# Patient Record
Sex: MALE | Race: BLACK OR AFRICAN AMERICAN | Employment: FULL TIME | URBAN - METROPOLITAN AREA
[De-identification: names, ages, dates, MRNs, and addresses within clinical notes are randomized per-mention and may not be internally consistent; named-entity substitution may affect disease eponyms.]

---

## 2017-01-25 ENCOUNTER — HOSPITAL ENCOUNTER (EMERGENCY)
Age: 42
Discharge: HOME | End: 2017-01-25
Attending: EMERGENCY MEDICINE
Payer: SELF-PAY

## 2017-01-25 ENCOUNTER — APPOINTMENT (OUTPATIENT)
Dept: GENERAL RADIOLOGY | Age: 42
End: 2017-01-25
Attending: EMERGENCY MEDICINE
Payer: SELF-PAY

## 2017-01-25 VITALS
TEMPERATURE: 98.2 F | SYSTOLIC BLOOD PRESSURE: 116 MMHG | OXYGEN SATURATION: 98 % | DIASTOLIC BLOOD PRESSURE: 78 MMHG | BODY MASS INDEX: 28.88 KG/M2 | HEIGHT: 74 IN | HEART RATE: 70 BPM | WEIGHT: 225 LBS | RESPIRATION RATE: 16 BRPM

## 2017-01-25 DIAGNOSIS — M50.30 DDD (DEGENERATIVE DISC DISEASE), CERVICAL: ICD-10-CM

## 2017-01-25 DIAGNOSIS — R03.0 ELEVATED BLOOD PRESSURE READING: ICD-10-CM

## 2017-01-25 DIAGNOSIS — M54.12 CERVICAL RADICULAR PAIN: Primary | ICD-10-CM

## 2017-01-25 LAB
ANION GAP BLD CALC-SCNC: 6 MMOL/L (ref 3–18)
ATRIAL RATE: 71 BPM
BASOPHILS # BLD AUTO: 0.1 K/UL (ref 0–0.06)
BASOPHILS # BLD: 1 % (ref 0–2)
BUN SERPL-MCNC: 13 MG/DL (ref 7–18)
BUN/CREAT SERPL: 13 (ref 12–20)
CALCIUM SERPL-MCNC: 8.9 MG/DL (ref 8.5–10.1)
CALCULATED P AXIS, ECG09: 46 DEGREES
CALCULATED R AXIS, ECG10: 60 DEGREES
CALCULATED T AXIS, ECG11: 54 DEGREES
CHLORIDE SERPL-SCNC: 106 MMOL/L (ref 100–108)
CK MB CFR SERPL CALC: 0.7 % (ref 0–4)
CK MB SERPL-MCNC: 2.4 NG/ML (ref 0.5–3.6)
CK SERPL-CCNC: 324 U/L (ref 39–308)
CO2 SERPL-SCNC: 27 MMOL/L (ref 21–32)
CREAT SERPL-MCNC: 0.98 MG/DL (ref 0.6–1.3)
DIAGNOSIS, 93000: NORMAL
DIFFERENTIAL METHOD BLD: ABNORMAL
EOSINOPHIL # BLD: 0.4 K/UL (ref 0–0.4)
EOSINOPHIL NFR BLD: 7 % (ref 0–5)
ERYTHROCYTE [DISTWIDTH] IN BLOOD BY AUTOMATED COUNT: 14.1 % (ref 11.6–14.5)
GLUCOSE SERPL-MCNC: 108 MG/DL (ref 74–99)
HCT VFR BLD AUTO: 42.5 % (ref 36–48)
HGB BLD-MCNC: 14.4 G/DL (ref 13–16)
LYMPHOCYTES # BLD AUTO: 36 % (ref 21–52)
LYMPHOCYTES # BLD: 2 K/UL (ref 0.9–3.6)
MCH RBC QN AUTO: 31.2 PG (ref 24–34)
MCHC RBC AUTO-ENTMCNC: 33.9 G/DL (ref 31–37)
MCV RBC AUTO: 92.2 FL (ref 74–97)
MONOCYTES # BLD: 0.3 K/UL (ref 0.05–1.2)
MONOCYTES NFR BLD AUTO: 6 % (ref 3–10)
NEUTS SEG # BLD: 2.7 K/UL (ref 1.8–8)
NEUTS SEG NFR BLD AUTO: 50 % (ref 40–73)
P-R INTERVAL, ECG05: 190 MS
PLATELET # BLD AUTO: 168 K/UL (ref 135–420)
PMV BLD AUTO: 12.4 FL (ref 9.2–11.8)
POTASSIUM SERPL-SCNC: 3.9 MMOL/L (ref 3.5–5.5)
Q-T INTERVAL, ECG07: 380 MS
QRS DURATION, ECG06: 90 MS
QTC CALCULATION (BEZET), ECG08: 412 MS
RBC # BLD AUTO: 4.61 M/UL (ref 4.7–5.5)
SODIUM SERPL-SCNC: 139 MMOL/L (ref 136–145)
TROPONIN I SERPL-MCNC: <0.02 NG/ML (ref 0–0.04)
VENTRICULAR RATE, ECG03: 71 BPM
WBC # BLD AUTO: 5.4 K/UL (ref 4.6–13.2)

## 2017-01-25 PROCEDURE — 85025 COMPLETE CBC W/AUTO DIFF WBC: CPT | Performed by: EMERGENCY MEDICINE

## 2017-01-25 PROCEDURE — 80048 BASIC METABOLIC PNL TOTAL CA: CPT | Performed by: EMERGENCY MEDICINE

## 2017-01-25 PROCEDURE — 96374 THER/PROPH/DIAG INJ IV PUSH: CPT

## 2017-01-25 PROCEDURE — 72050 X-RAY EXAM NECK SPINE 4/5VWS: CPT

## 2017-01-25 PROCEDURE — 99283 EMERGENCY DEPT VISIT LOW MDM: CPT

## 2017-01-25 PROCEDURE — 74011250636 HC RX REV CODE- 250/636: Performed by: EMERGENCY MEDICINE

## 2017-01-25 PROCEDURE — 82550 ASSAY OF CK (CPK): CPT | Performed by: EMERGENCY MEDICINE

## 2017-01-25 PROCEDURE — 93005 ELECTROCARDIOGRAM TRACING: CPT

## 2017-01-25 PROCEDURE — 71020 XR CHEST PA LAT: CPT

## 2017-01-25 RX ORDER — METHYLPREDNISOLONE 4 MG/1
TABLET ORAL
Qty: 1 DOSE PACK | Refills: 0 | Status: SHIPPED | OUTPATIENT
Start: 2017-01-25 | End: 2017-10-10

## 2017-01-25 RX ORDER — GABAPENTIN 300 MG/1
300 CAPSULE ORAL 3 TIMES DAILY
Qty: 30 CAP | Refills: 0 | Status: SHIPPED | OUTPATIENT
Start: 2017-01-25 | End: 2017-02-04

## 2017-01-25 RX ORDER — IBUPROFEN 800 MG/1
800 TABLET ORAL EVERY 8 HOURS
Qty: 15 TAB | Refills: 0 | Status: SHIPPED | OUTPATIENT
Start: 2017-01-25 | End: 2017-01-30

## 2017-01-25 RX ORDER — KETOROLAC TROMETHAMINE 30 MG/ML
30 INJECTION, SOLUTION INTRAMUSCULAR; INTRAVENOUS
Status: COMPLETED | OUTPATIENT
Start: 2017-01-25 | End: 2017-01-25

## 2017-01-25 RX ADMIN — KETOROLAC TROMETHAMINE 30 MG: 30 INJECTION, SOLUTION INTRAMUSCULAR at 08:51

## 2017-01-25 NOTE — ED TRIAGE NOTES
L shoulder pain, back pain and L arm pain x one week, \"feels like a pinched nerve\", denies recent injury/trauma to L shoulder    L groin pain, onset three weeks, states L leg is numb when he wakes up

## 2017-01-25 NOTE — PROGRESS NOTES
Referral received from ED PA to assist with PCP follow up. Met with pt and agreed for me to assist. Appointment scheduled with Giana Asif NP on Monday 1/30/17 at 1000 check in at 56. Appointment information given to pt.

## 2017-01-25 NOTE — ED PROVIDER NOTES
Patient is a 46 y.o. male presenting with shoulder pain and groin pain. The history is provided by the patient. Shoulder Pain    Pertinent negatives include no numbness. Groin Pain   Associated symptoms include chest pain. Pertinent negatives include no abdominal pain and no shortness of breath. Pt presents with c/o left shoulder pain radiating from his neck and chest x 1 week. Pain improves w/left arm elevation. Denies numbness. Says pain is \"burning, like a pinched nerve. \"  Denies exertional chest pain, SOB. Denies neck trauma, rigidity, fever, rash. Denies ETOH, tobacco, illicits. Past Medical History:   Diagnosis Date    Asthma        History reviewed. No pertinent past surgical history. History reviewed. No pertinent family history. Social History     Social History    Marital status: SINGLE     Spouse name: N/A    Number of children: N/A    Years of education: N/A     Occupational History    Not on file. Social History Main Topics    Smoking status: Never Smoker    Smokeless tobacco: Not on file    Alcohol use No    Drug use: No    Sexual activity: Yes     Partners: Female      Comment:  haider ok to give info     Other Topics Concern    Not on file     Social History Narrative         ALLERGIES: Robitussin [guaifenesin] and Shellfish derived    Review of Systems   Constitutional: Negative for fever. Respiratory: Negative for shortness of breath. Cardiovascular: Positive for chest pain. Gastrointestinal: Negative for abdominal pain, diarrhea, nausea and vomiting. Genitourinary: Negative for flank pain. Musculoskeletal: Positive for arthralgias. Skin: Negative for rash. Neurological: Negative for weakness and numbness. All other systems reviewed and are negative.       Vitals:    01/25/17 0725   BP: (!) 137/99   Pulse: 66   Resp: 14   Temp: 98.2 °F (36.8 °C)   Weight: 102.1 kg (225 lb)   Height: 6' 2\" (1.88 m)            Physical Exam Constitutional: Vital signs are normal. He appears well-developed and well-nourished. He is active. Non-toxic appearance. He does not appear ill. No distress. HENT:   Head: Normocephalic and atraumatic. Neck: Normal range of motion. Neck supple. Carotid bruit is not present. No tracheal deviation present. No thyromegaly present. Pain with axial loading. Midline inferior cervical spine TTP. Cardiovascular: Normal rate, regular rhythm and normal heart sounds. Exam reveals no gallop and no friction rub. No murmur heard. Pulmonary/Chest: Effort normal and breath sounds normal. No stridor. No respiratory distress. He has no wheezes. He has no rales. He exhibits no tenderness. Abdominal: Soft. He exhibits no distension and no mass. There is no tenderness. There is no rebound, no guarding and no CVA tenderness. Musculoskeletal: Normal range of motion. Neurological: He is alert. Skin: Skin is warm, dry and intact. He is not diaphoretic. No pallor. Psychiatric: He has a normal mood and affect. His speech is normal and behavior is normal. Judgment and thought content normal.   Nursing note and vitals reviewed. MDM  Number of Diagnoses or Management Options  Cervical radicular pain:   DDD (degenerative disc disease), cervical:   Diagnosis management comments: Differential: DDD; radiculopathy; musculoskeletal strain; angina; thoracic outlet    Pt has pain radiating from neck to left shoulder to left arm. Moderate DDD on c-spine. Cardiac labs negative.   Give PCP referral.       Amount and/or Complexity of Data Reviewed  Clinical lab tests: reviewed and ordered  Tests in the radiology section of CPT®: ordered and reviewed      ED Course       EKG  Date/Time: 1/25/2017 9:29 AM  Performed by: Shahnaz Magallon  Authorized by: Shahnaz Magallon     Interpretation:     Interpretation: normal    Rate:     ECG rate:  71    ECG rate assessment: normal    Rhythm:     Rhythm: sinus rhythm    Ectopy:     Ectopy: none    QRS:     QRS axis:  Normal    QRS intervals:  Normal  Conduction:     Conduction: normal    ST segments:     ST segments:  Normal  T waves:     T waves: normal                 Recent Results (from the past 12 hour(s))   EKG, 12 LEAD, INITIAL    Collection Time: 01/25/17  7:35 AM   Result Value Ref Range    Ventricular Rate 71 BPM    Atrial Rate 71 BPM    P-R Interval 190 ms    QRS Duration 90 ms    Q-T Interval 380 ms    QTC Calculation (Bezet) 412 ms    Calculated P Axis 46 degrees    Calculated R Axis 60 degrees    Calculated T Axis 54 degrees    Diagnosis       Sinus rhythm with marked sinus arrhythmia  Otherwise normal ECG  When compared with ECG of 20-AUG-2015 09:40,  No significant change was found     CARDIAC PANEL,(CK, CKMB & TROPONIN)    Collection Time: 01/25/17  8:50 AM   Result Value Ref Range     (H) 39 - 308 U/L    CK - MB 2.4 0.5 - 3.6 ng/ml    CK-MB Index 0.7 0.0 - 4.0 %    Troponin-I, Qt. <0.02 0.0 - 4.978 NG/ML   METABOLIC PANEL, BASIC    Collection Time: 01/25/17  8:50 AM   Result Value Ref Range    Sodium 139 136 - 145 mmol/L    Potassium 3.9 3.5 - 5.5 mmol/L    Chloride 106 100 - 108 mmol/L    CO2 27 21 - 32 mmol/L    Anion gap 6 3.0 - 18 mmol/L    Glucose 108 (H) 74 - 99 mg/dL    BUN 13 7.0 - 18 MG/DL    Creatinine 0.98 0.6 - 1.3 MG/DL    BUN/Creatinine ratio 13 12 - 20      GFR est AA >60 >60 ml/min/1.73m2    GFR est non-AA >60 >60 ml/min/1.73m2    Calcium 8.9 8.5 - 10.1 MG/DL   CBC WITH AUTOMATED DIFF    Collection Time: 01/25/17  8:50 AM   Result Value Ref Range    WBC 5.4 4.6 - 13.2 K/uL    RBC 4.61 (L) 4.70 - 5.50 M/uL    HGB 14.4 13.0 - 16.0 g/dL    HCT 42.5 36.0 - 48.0 %    MCV 92.2 74.0 - 97.0 FL    MCH 31.2 24.0 - 34.0 PG    MCHC 33.9 31.0 - 37.0 g/dL    RDW 14.1 11.6 - 14.5 %    PLATELET 838 222 - 926 K/uL    MPV 12.4 (H) 9.2 - 11.8 FL    NEUTROPHILS 50 40 - 73 %    LYMPHOCYTES 36 21 - 52 %    MONOCYTES 6 3 - 10 %    EOSINOPHILS 7 (H) 0 - 5 %    BASOPHILS 1 0 - 2 % ABS. NEUTROPHILS 2.7 1.8 - 8.0 K/UL    ABS. LYMPHOCYTES 2.0 0.9 - 3.6 K/UL    ABS. MONOCYTES 0.3 0.05 - 1.2 K/UL    ABS. EOSINOPHILS 0.4 0.0 - 0.4 K/UL    ABS. BASOPHILS 0.1 (H) 0.0 - 0.06 K/UL    DF AUTOMATED       9:29 AM  Diagnosis:   1. Cervical radicular pain    2. DDD (degenerative disc disease), cervical    3. Elevated blood pressure reading          Disposition: home    Follow-up Information     Follow up With Details Comments Contact Dionisio Chavez MD Schedule an appointment as soon as possible for a visit today  [address] [facility], [place] [address] [facility] EMERGENCY DEPT  If symptoms worsen return immediately [address] [place] [address]    Jose De Jesus Elder MD Schedule an appointment as soon as possible for a visit in 1 week  [address] [phone]            Patient's Medications   Start Taking    GABAPENTIN (NEURONTIN) 300 MG CAPSULE    Take 1 Cap by mouth three (3) times daily for 10 days. IBUPROFEN (MOTRIN) 800 MG TABLET    Take 1 Tab by mouth every eight (8) hours for 5 days. METHYLPREDNISOLONE (MEDROL, BON,) 4 MG TABLET    Per dose pack instructions   Continue Taking    No medications on file   These Medications have changed    No medications on file   Stop Taking    ALBUTEROL (PROVENTIL HFA, VENTOLIN HFA, PROAIR HFA) 90 MCG/ACTUATION INHALER    Take 1-2 puffs by inhalation every four (4) hours as needed for Wheezing. HYDROCODONE-ACETAMINOPHEN (NORCO) 5-325 MG PER TABLET    Take 1-2 tablets PO every 4-6 hours as needed for pain control. If over the counter ibuprofen or acetaminophen was suggested, then only take the vicodin for pain not well controlled with the over the counter medication.

## 2017-01-30 ENCOUNTER — OFFICE VISIT (OUTPATIENT)
Dept: INTERNAL MEDICINE CLINIC | Age: 42
End: 2017-01-30

## 2017-01-30 VITALS
OXYGEN SATURATION: 100 % | HEIGHT: 74 IN | TEMPERATURE: 97.2 F | WEIGHT: 217 LBS | SYSTOLIC BLOOD PRESSURE: 129 MMHG | BODY MASS INDEX: 27.85 KG/M2 | RESPIRATION RATE: 16 BRPM | DIASTOLIC BLOOD PRESSURE: 94 MMHG | HEART RATE: 56 BPM

## 2017-01-30 DIAGNOSIS — M54.2 NECK PAIN: Primary | ICD-10-CM

## 2017-01-30 DIAGNOSIS — M54.12 CERVICAL RADICULAR PAIN: ICD-10-CM

## 2017-01-30 NOTE — PROGRESS NOTES
HISTORY OF PRESENT ILLNESS  Marisela Wray is a 46 y.o. male. Patient presents for a ED follow up for neck and back pain,states he went to [facility] last week and was dx with \"collapsed spine or vertebrae\", rates pain as 10/10. States he was given medications for pain and was asked to follow up with the clinic. Patient complaints of pain radiation to left arm associated with tingling. Patient denies ay HA,dizziness, NVD,abd pain, dysuria , bladder or bowel incontinence, SOB, CP. Patient referred to spine specialist, appointment made and confirmed by nurse, provider explained the referral process. Patient became  very irritable when he was told his appointment will be on the 2/2/2017, patient was expecting same day appointment as he claims he was told when he left the hospital.    Hospital Follow Up   The history is provided by the patient. Review of Systems   Constitutional: Negative. HENT: Negative. Eyes: Negative. Cardiovascular: Negative. Musculoskeletal: Positive for back pain, joint pain and neck pain. Skin: Negative. Neurological: Positive for tingling and sensory change. Negative for focal weakness, seizures and loss of consciousness. Psychiatric/Behavioral:        Irritable       Physical Exam   Constitutional: He is oriented to person, place, and time. He appears well-developed and well-nourished. BP (!) 129/94 (BP 1 Location: Right arm, BP Patient Position: Sitting)  Pulse (!) 56  Temp 97.2 °F (36.2 °C) (Oral)   Resp 16  Ht 6' 2\" (1.88 m)  Wt 217 lb (98.4 kg)  SpO2 100%  BMI 27.86 kg/m2     HENT:   Head: Normocephalic and atraumatic. Eyes: Conjunctivae and EOM are normal. Pupils are equal, round, and reactive to light. Cardiovascular: Normal rate. Pulmonary/Chest: Effort normal and breath sounds normal.   Musculoskeletal: He exhibits tenderness. Cervical back: He exhibits decreased range of motion, tenderness and pain. Thoracic back: He exhibits tenderness and pain. Neurological: He is alert and oriented to person, place, and time. He has normal strength. GCS eye subscore is 4. GCS verbal subscore is 5. GCS motor subscore is 6. Skin: Skin is warm and dry. Psychiatric: His speech is normal and behavior is normal. Judgment and thought content normal. Cognition and memory are normal.   Vitals reviewed. ASSESSMENT and PLAN    ICD-10-CM ICD-9-CM    1. Neck pain M54.2 723.1 REFERRAL TO ORTHOPEDIC SURGERY   2. Cervical radicular pain M54.12 723.4 REFERRAL TO ORTHOPEDIC SURGERY     Encounter Diagnoses   Name Primary?  Neck pain Yes    Cervical radicular pain      Orders Placed This Encounter    REFERRAL TO ORTHOPEDIC SURGERY     Orders Placed This Encounter    REFERRAL TO ORTHOPEDIC SURGERY     Referral Priority:   Urgent     Referral Type:   Consultation     Referral Reason:   Specialty Services Required     Orders Placed This Encounter    REFERRAL TO Abhinav Renae was seen today for hospital follow up. Diagnoses and all orders for this visit:    Neck pain  -     REFERRAL TO ORTHOPEDIC SURGERY    Cervical radicular pain  -     REFERRAL TO ORTHOPEDIC SURGERY      Follow-up Disposition:  Return if symptoms worsen or fail to improve.   current treatment plan is effective, no change in therapy  the following changes in treatment are made: referral to ortho/spine specialist

## 2017-01-30 NOTE — PROGRESS NOTES
Pt presented today with ER follow up, pt was seen in ER for neck pain on 01/27/2017. Has pt had any falls since last visit? No.  Pt preferred language for health care discussion is english. Advanced Directive? No    Is someone accompanying this pt? No    Is the patient using any DME equipment during OV? No      1. Have you been to the ER, urgent care clinic since your last visit? Hospitalized since your last visit? Yes When: 01/27/2017 [facility] for neck pain    2. Have you seen or consulted any other health care providers outside of the [address] [place] [address] since your last visit? Include any colon screening. No      Pt has a reminder for a \"due or due soon\" health maintenance. I have asked that he contact his primary care provider for follow-up on this health maintenance.

## 2017-01-30 NOTE — PATIENT INSTRUCTIONS
Learning About How to Have a Healthy Back  What causes back pain? Back pain is often caused by overuse, strain, or injury. For example, people often hurt their backs playing sports or working in the yard, being jolted in a car accident, or lifting something too heavy. Aging plays a part too. Your bones and muscles tend to lose strength as you age, which makes injury more likely. The spongy discs between the bones of the spine (vertebrae) may suffer from wear and tear and no longer provide enough cushion between the bones. A disc that bulges or breaks open (herniated disc) can press on nerves, causing back pain. In some people, back pain is the result of arthritis, broken vertebrae caused by bone loss (osteoporosis), illness, or a spine problem. Although most people have back pain at one time or another, there are steps you can take to make it less likely. How can you have a healthy back? Reduce stress on your back through good posture  Slumping or slouching alone may not cause low back pain. But after the back has been strained or injured, bad posture can make pain worse. · Sleep in a position that maintains your back's normal curves and on a mattress that feels comfortable. Sleep on your side with a pillow between your knees, or sleep on your back with a pillow under your knees. These positions can reduce strain on your back. · Stand and sit up straight. \"Good posture\" generally means your ears, shoulders, and hips are in a straight line. · If you must stand for a long time, put one foot on a stool, ledge, or box. Switch feet every now and then. · Sit in a chair that is low enough to let you place both feet flat on the floor with both knees nearly level with your hips. If your chair or desk is too high, use a footrest to raise your knees. Place a small pillow, a rolled-up towel, or a lumbar roll in the curve of your back if you need extra support.   · Try a kneeling chair, which helps tilt your hips forward. This takes pressure off your lower back. · Try sitting on an exercise ball. It can rock from side to side, which helps keep your back loose. · When driving, keep your knees nearly level with your hips. Sit straight, and drive with both hands on the steering wheel. Your arms should be in a slightly bent position. Reduce stress on your back through careful lifting  · Squat down, bending at the hips and knees only. If you need to, put one knee to the floor and extend your other knee in front of you, bent at a right angle (half kneeling). · Press your chest straight forward. This helps keep your upper back straight while keeping a slight arch in your low back. · Hold the load as close to your body as possible, at the level of your belly button (navel). · Use your feet to change direction, taking small steps. · Lead with your hips as you change direction. Keep your shoulders in line with your hips as you move. · Set down your load carefully, squatting with your knees and hips only. Exercise and stretch your back  · Do some exercise on most days of the week, if your doctor says it is okay. You can walk, run, swim, or cycle. · Stretch your back muscles. Here are a few exercises to try:  Radha Yenifer on your back, and gently pull one bent knee to your chest. Put that foot back on the floor, and then pull the other knee to your chest.  ¨ Do pelvic tilts. Lie on your back with your knees bent. Tighten your stomach muscles. Pull your belly button (navel) in and up toward your ribs. You should feel like your back is pressing to the floor and your hips and pelvis are slightly lifting off the floor. Hold for 6 seconds while breathing smoothly. ¨ Sit with your back flat against a wall. · Keep your core muscles strong. The muscles of your back, belly (abdomen), and buttocks support your spine. ¨ Pull in your belly and imagine pulling your navel toward your spine. Hold this for 6 seconds, then relax.  Remember to keep breathing normally as you tense your muscles. ¨ Do curl-ups. Always do them with your knees bent. Keep your low back on the floor, and curl your shoulders toward your knees using a smooth, slow motion. Keep your arms folded across your chest. If this bothers your neck, try putting your hands behind your neck (not your head), with your elbows spread apart. ¨ Lie on your back with your knees bent and your feet flat on the floor. Tighten your belly muscles, and then push with your feet and raise your buttocks up a few inches. Hold this position 6 seconds as you continue to breathe normally, then lower yourself slowly to the floor. Repeat 8 to 12 times. ¨ If you like group exercise, try Pilates or yoga. These classes have poses that strengthen the core muscles. Lead a healthy lifestyle  · Stay at a healthy weight to avoid strain on your back. · Do not smoke. Smoking increases the risk of osteoporosis, which weakens the spine. If you need help quitting, talk to your doctor about stop-smoking programs and medicines. These can increase your chances of quitting for good. Where can you learn more? Go to http://loreeGlowforthedward.info/. Enter L315 in the search box to learn more about \"Learning About How to Have a Healthy Back. \"  Current as of: May 23, 2016  Content Version: 11.1  © 9188-1624 [employer]. Care instructions adapted under license by [employer] (which disclaims liability or warranty for this information). If you have questions about a medical condition or this instruction, always ask your healthcare professional. [place] [address] any warranty or liability for your use of this information. Pinched Nerve in the Neck: Care Instructions  Your Care Instructions  A pinched nerve in the neck happens when a vertebra or disc in the upper part of your spine is damaged. This damage can happen because of an injury. Or it can just happen with age.   The changes caused by the damage may put pressure on a nearby nerve root, pinching it. This causes symptoms such as sharp pain in your neck, shoulder, arm, or back. You may also have tingling or numbness. Sometimes it makes your arm weaker. The symptoms are usually worse when you turn your head or strain your neck. For many people, the symptoms get better over time and finally go away. Early treatment usually includes medicines for pain and swelling. Sometimes physical therapy and special exercises may help. Follow-up care is a key part of your treatment and safety. Be sure to make and go to all appointments, and call your doctor if you are having problems. It's also a good idea to know your test results and keep a list of the medicines you take. How can you care for yourself at home? · Be safe with medicines. Read and follow all instructions on the label. ¨ If the doctor gave you a prescription medicine for pain, take it as prescribed. ¨ If you are not taking a prescription pain medicine, ask your doctor if you can take an over-the-counter medicine. · Try using a heating pad on a low or medium setting for 15 to 20 minutes every 2 or 3 hours. Try a warm shower in place of one session with the heating pad. You can also buy single-use heat wraps that last up to 8 hours. · You can also try an ice pack for 10 to 15 minutes every 2 to 3 hours. There isn't strong evidence that either heat or ice will help. But you can try them to see if they help you. · Don't spend too long in one position. Take short breaks to move around and change positions. · Wear a seat belt and shoulder harness when you are in a car. · Sleep with a pillow under your head and neck that keeps your neck straight. · If you were given a neck brace (cervical collar) to limit neck motion, wear it as instructed for as many days as your doctor tells you to. Do not wear it longer than you were told to.  Wearing a brace for too long can lead to neck stiffness and can weaken the neck muscles. · Follow your doctor's instructions for gentle neck-stretching exercises. · Do not smoke. Smoking can slow healing of your discs. If you need help quitting, talk to your doctor about stop-smoking programs and medicines. These can increase your chances of quitting for good. · Avoid strenuous work or exercise until your doctor says it is okay. When should you call for help? Call 911 anytime you think you may need emergency care. For example, call if:  · You are unable to move an arm or a leg at all. Call your doctor now or seek immediate medical care if:  · You have new or worse symptoms in your arms, legs, chest, belly, or buttocks. Symptoms may include:  ¨ Numbness or tingling. ¨ Weakness. ¨ Pain. · You lose bladder or bowel control. Watch closely for changes in your health, and be sure to contact your doctor if:  · You are not getting better as expected. Where can you learn more? Go to http://loree-edward.info/. Enter T098 in the search box to learn more about \"Pinched Nerve in the Neck: Care Instructions. \"  Current as of: May 23, 2016  Content Version: 11.1  © 6669-7850 [employer]. Care instructions adapted under license by [employer] (which disclaims liability or warranty for this information). If you have questions about a medical condition or this instruction, always ask your healthcare professional. [place] [address] any warranty or liability for your use of this information.

## 2017-01-30 NOTE — MR AVS SNAPSHOT
Visit Information Date & Time Provider Department Dept. Phone Encounter #  
 1/30/2017 10:00 AM Christiano Herrmann NP Outline [phone] [id number] Upcoming Health Maintenance Date Due Pneumococcal 19-64 Medium Risk (1 of 1 - PPSV23) 11/12/1996 DTaP/Tdap/Td series (1 - Tdap) 11/12/1998 INFLUENZA AGE 9 TO ADULT 8/1/2016 Allergies as of 1/30/2017  Review Complete On: 1/30/2017 By: Fallon Hurt LPN Severity Noted Reaction Type Reactions Robitussin [Guaifenesin]  02/03/2015    Itching Shellfish Derived  02/03/2015    Itching, Swelling Current Immunizations  Never Reviewed No immunizations on file. Not reviewed this visit You Were Diagnosed With   
  
 Codes Comments Neck pain    -  Primary ICD-10-CM: M54.2 ICD-9-CM: 723.1 Cervical radicular pain     ICD-10-CM: M54.12 
ICD-9-CM: 723.4 Vitals BP Pulse Temp Resp Height(growth percentile) Weight(growth percentile) (!) 129/94 (BP 1 Location: Right arm, BP Patient Position: Sitting) (!) 56 97.2 °F (36.2 °C) (Oral) 16 6' 2\" (1.88 m) 217 lb (98.4 kg) SpO2 BMI Smoking Status 100% 27.86 kg/m2 Never Smoker Vitals History BMI and BSA Data Body Mass Index Body Surface Area  
 27.86 kg/m 2 2.27 m 2 Preferred Pharmacy Pharmacy Name Phone [facility] [address] [place] [address] [phone] Your Updated Medication List  
  
   
This list is accurate as of: 1/30/17 11:12 AM.  Always use your most recent med list.  
  
  
  
  
 gabapentin 300 mg capsule Commonly known as:  NEURONTIN Take 1 Cap by mouth three (3) times daily for 10 days. ibuprofen 800 mg tablet Commonly known as:  MOTRIN Take 1 Tab by mouth every eight (8) hours for 5 days. methylPREDNISolone 4 mg tablet Commonly known as:  MEDROL (BON) Per dose pack instructions We Performed the Following REFERRAL TO ORTHOPEDIC SURGERY [REF62 Custom] Comments:  
 Please evaluate patient for Cervical Radiculopathy pain Referral Information Referral ID Referred By Referred To  
  
 [id number] MARVA NICHOLS Not Available Visits Status Start Date End Date 1 New Request 1/30/17 1/30/18 If your referral has a status of pending review or denied, additional information will be sent to support the outcome of this decision. Patient Instructions Learning About How to Have a Healthy Back What causes back pain? Back pain is often caused by overuse, strain, or injury. For example, people often hurt their backs playing sports or working in the yard, being jolted in a car accident, or lifting something too heavy. Aging plays a part too. Your bones and muscles tend to lose strength as you age, which makes injury more likely. The spongy discs between the bones of the spine (vertebrae) may suffer from wear and tear and no longer provide enough cushion between the bones. A disc that bulges or breaks open (herniated disc) can press on nerves, causing back pain. In some people, back pain is the result of arthritis, broken vertebrae caused by bone loss (osteoporosis), illness, or a spine problem. Although most people have back pain at one time or another, there are steps you can take to make it less likely. How can you have a healthy back? Reduce stress on your back through good posture Slumping or slouching alone may not cause low back pain. But after the back has been strained or injured, bad posture can make pain worse. · Sleep in a position that maintains your back's normal curves and on a mattress that feels comfortable. Sleep on your side with a pillow between your knees, or sleep on your back with a pillow under your knees. These positions can reduce strain on your back. · Stand and sit up straight.  \"Good posture\" generally means your ears, shoulders, and hips are in a straight line. · If you must stand for a long time, put one foot on a stool, ledge, or box. Switch feet every now and then. · Sit in a chair that is low enough to let you place both feet flat on the floor with both knees nearly level with your hips. If your chair or desk is too high, use a footrest to raise your knees. Place a small pillow, a rolled-up towel, or a lumbar roll in the curve of your back if you need extra support. · Try a kneeling chair, which helps tilt your hips forward. This takes pressure off your lower back. · Try sitting on an exercise ball. It can rock from side to side, which helps keep your back loose. · When driving, keep your knees nearly level with your hips. Sit straight, and drive with both hands on the steering wheel. Your arms should be in a slightly bent position. Reduce stress on your back through careful lifting · Squat down, bending at the hips and knees only. If you need to, put one knee to the floor and extend your other knee in front of you, bent at a right angle (half kneeling). · Press your chest straight forward. This helps keep your upper back straight while keeping a slight arch in your low back. · Hold the load as close to your body as possible, at the level of your belly button (navel). · Use your feet to change direction, taking small steps. · Lead with your hips as you change direction. Keep your shoulders in line with your hips as you move. · Set down your load carefully, squatting with your knees and hips only. Exercise and stretch your back · Do some exercise on most days of the week, if your doctor says it is okay. You can walk, run, swim, or cycle. · Stretch your back muscles. Here are a few exercises to try: ¨ Lie on your back, and gently pull one bent knee to your chest. Put that foot back on the floor, and then pull the other knee to your chest. 
¨ Do pelvic tilts. Lie on your back with your knees bent.  Tighten your stomach muscles. Pull your belly button (navel) in and up toward your ribs. You should feel like your back is pressing to the floor and your hips and pelvis are slightly lifting off the floor. Hold for 6 seconds while breathing smoothly. ¨ Sit with your back flat against a wall. · Keep your core muscles strong. The muscles of your back, belly (abdomen), and buttocks support your spine. ¨ Pull in your belly and imagine pulling your navel toward your spine. Hold this for 6 seconds, then relax. Remember to keep breathing normally as you tense your muscles. ¨ Do curl-ups. Always do them with your knees bent. Keep your low back on the floor, and curl your shoulders toward your knees using a smooth, slow motion. Keep your arms folded across your chest. If this bothers your neck, try putting your hands behind your neck (not your head), with your elbows spread apart. ¨ Lie on your back with your knees bent and your feet flat on the floor. Tighten your belly muscles, and then push with your feet and raise your buttocks up a few inches. Hold this position 6 seconds as you continue to breathe normally, then lower yourself slowly to the floor. Repeat 8 to 12 times. ¨ If you like group exercise, try Pilates or yoga. These classes have poses that strengthen the core muscles. Lead a healthy lifestyle · Stay at a healthy weight to avoid strain on your back. · Do not smoke. Smoking increases the risk of osteoporosis, which weakens the spine. If you need help quitting, talk to your doctor about stop-smoking programs and medicines. These can increase your chances of quitting for good. Where can you learn more? Go to http://loree-edward.info/. Enter L315 in the search box to learn more about \"Learning About How to Have a Healthy Back. \" Current as of: May 23, 2016 Content Version: 11.1 © 1251-4546 [employer].  Care instructions adapted under license by [address] (which disclaims liability or warranty for this information). If you have questions about a medical condition or this instruction, always ask your healthcare professional. [address] any warranty or liability for your use of this information. Pinched Nerve in the Neck: Care Instructions Your Care Instructions A pinched nerve in the neck happens when a vertebra or disc in the upper part of your spine is damaged. This damage can happen because of an injury. Or it can just happen with age. The changes caused by the damage may put pressure on a nearby nerve root, pinching it. This causes symptoms such as sharp pain in your neck, shoulder, arm, or back. You may also have tingling or numbness. Sometimes it makes your arm weaker. The symptoms are usually worse when you turn your head or strain your neck. For many people, the symptoms get better over time and finally go away. Early treatment usually includes medicines for pain and swelling. Sometimes physical therapy and special exercises may help. Follow-up care is a key part of your treatment and safety. Be sure to make and go to all appointments, and call your doctor if you are having problems. It's also a good idea to know your test results and keep a list of the medicines you take. How can you care for yourself at home? · Be safe with medicines. Read and follow all instructions on the label. ¨ If the doctor gave you a prescription medicine for pain, take it as prescribed. ¨ If you are not taking a prescription pain medicine, ask your doctor if you can take an over-the-counter medicine. · Try using a heating pad on a low or medium setting for 15 to 20 minutes every 2 or 3 hours. Try a warm shower in place of one session with the heating pad. You can also buy single-use heat wraps that last up to 8 hours. · You can also try an ice pack for 10 to 15 minutes every 2 to 3 hours. There isn't strong evidence that either heat or ice will help. But you can try them to see if they help you. · Don't spend too long in one position. Take short breaks to move around and change positions. · Wear a seat belt and shoulder harness when you are in a car. · Sleep with a pillow under your head and neck that keeps your neck straight. · If you were given a neck brace (cervical collar) to limit neck motion, wear it as instructed for as many days as your doctor tells you to. Do not wear it longer than you were told to. Wearing a brace for too long can lead to neck stiffness and can weaken the neck muscles. · Follow your doctor's instructions for gentle neck-stretching exercises. · Do not smoke. Smoking can slow healing of your discs. If you need help quitting, talk to your doctor about stop-smoking programs and medicines. These can increase your chances of quitting for good. · Avoid strenuous work or exercise until your doctor says it is okay. When should you call for help? Call 911 anytime you think you may need emergency care. For example, call if: 
· You are unable to move an arm or a leg at all. Call your doctor now or seek immediate medical care if: 
· You have new or worse symptoms in your arms, legs, chest, belly, or buttocks. Symptoms may include: ¨ Numbness or tingling. ¨ Weakness. ¨ Pain. · You lose bladder or bowel control. Watch closely for changes in your health, and be sure to contact your doctor if: 
· You are not getting better as expected. Where can you learn more? Go to http://loree-edward.info/. Enter S347 in the search box to learn more about \"Pinched Nerve in the Neck: Care Instructions. \" Current as of: May 23, 2016 Content Version: 11.1 © 9415-1767 [employer]. Care instructions adapted under license by [employer] (which disclaims liability or warranty for this information).  If you have questions about a medical condition or this instruction, always ask your healthcare professional. [place] [address] any warranty or liability for your use of this information. Introducing [facility]! Mike Aguilar introduces [employer] patient portal. Now you can access parts of your medical record, email your doctor's office, and request medication refills online. 1. In your internet browser, go to [email or web address] 2. Click on the First Time User? Click Here link in the Sign In box. You will see the New Member Sign Up page. 3. Enter your [employer] Access Code exactly as it appears below. You will not need to use this code after youve completed the sign-up process. If you do not sign up before the expiration date, you must request a new code. · [employer] Access Code: [email or web address] Expires: 4/25/2017  7:34 AM 
 
4. Enter the last four digits of your Social Security Number (xxxx) and Date of Birth (mm/dd/yyyy) as indicated and click Submit. You will be taken to the next sign-up page. 5. Create a [employer] ID. This will be your [employer] login ID and cannot be changed, so think of one that is secure and easy to remember. 6. Create a [employer] password. You can change your password at any time. 7. Enter your Password Reset Question and Answer. This can be used at a later time if you forget your password. 8. Enter your e-mail address. You will receive e-mail notification when new information is available in [address] 9. Click Sign Up. You can now view and download portions of your medical record. 10. Click the Download Summary menu link to download a portable copy of your medical information. If you have questions, please visit the Frequently Asked Questions section of the [employer] website. Remember, [employer] is NOT to be used for urgent needs. For medical emergencies, dial 911. Now available from your iPhone and Android! Please provide this summary of care documentation to your next provider. Your primary care clinician is listed as MARVA NICHOLS. If you have any questions after today's visit, please call [phone].

## 2017-10-10 PROBLEM — R07.9 CHEST PAIN: Status: ACTIVE | Noted: 2017-10-10

## 2017-10-10 PROBLEM — R55 SYNCOPE: Status: ACTIVE | Noted: 2017-10-10

## 2017-10-12 ENCOUNTER — PATIENT OUTREACH (OUTPATIENT)
Dept: FAMILY MEDICINE CLINIC | Age: 42
End: 2017-10-12

## 2017-10-12 NOTE — PROGRESS NOTES
Isidro Ramos a 46 y.o. male was admitted to [facility]  on 10/10/17 to 10/11/17 for syncope and chest pain. Hospital course:  Patient presented with chief complain of not feeling well dizzy lightheaded and passing out spells, patient was admitted to regular floor on telemetry for observation, CT of the head was done which shows normal, patient was checked for orthostatic hypotension which   Pertinent labs/imaging:   CT of the head was done which shows normal, patient was checked for orthostatic hypotension which   vitamin D and B12 level  extremely low     Inpatient Consults:  none  Discharge diagnoses:   1. Syncope most likely vasovagal/orthostatic hypotension  2. Headache  3. Chest pain  4. Vitamin D deficiency  5. B12 deficiency    RRAT Score: <20    Hospital utilization in past 12 months: 1  ED utilization in past 12 months: 2    Contacted patient for hospital follow up. Introduced self, role and reason for call. Verified 2 patient identifiers. Patient reports:  \"I've had better days\"  Patient denies:  Headache, dizziness, chest pain or any other complaint  ADL's:  Feeds self: independently  Ambulates: independetnly    Self grooming: independently  Toileting: independently    DME:   None identified  Resources/Support:   Family and friends  AMD:   Non on ifle  Reconciled home medications and reviewed allergies. Instructed to bring all medications with him to next appointment. Educated patient to monitor and report the following Red flags: dizziness, severe headache, feeling of impending passing out or any new or concerning symptoms. Patient verbalized understanding of information discussed and is aware of  when to seek medical attention from PCP, urgent care or ED. Opportunity to ask questions was provided. Has contact information for future reference or further questions. Appointments:  10/17/17 1100 with PCP    Patient aware of appointments. He will provide transportation.      Potential Barriers to care  No apparent barriers to care identified at this time. Adherence to previous treatment and likelihood for follow-up:  Patient verbalized understanding of discharge instructions and need for follow up. Plan of Care/Goals:  Patient will follow up with PCP as planned  Patient will remain hospital free with no additional syncopal episodes    This represents Transitions of Care b/c NN spoke with patient and/or caregiver within 1 business days of discharge.   Patient's  [facility] appointment is scheduled 6 days post discharge

## 2022-03-30 NOTE — PROGRESS NOTES
Patient presents for a ED follow up for neck and back pain,states he went to [facility] last week and was dx with \"collapsed spine or vertebrae\", rates pain as 10/10. States he was given medications for pain and was asked to follow up with the clinic. Patient complaints of pain radiation to left arm associated with tingling. Patient denies ay HA,dizziness, NVD,abd pain, dysuria , bladder or bowel incontinence, SOB, CP. Patient referred to spine specialist, appointment made and confirmed by nurse, provider explained the referral process.  Patient became  very irritable when he was told his appointment will be on the 2/2/2017, patient was expecting same day appointment as he claims he was told when he left the hospital. 111
